# Patient Record
Sex: FEMALE | Race: WHITE | URBAN - METROPOLITAN AREA
[De-identification: names, ages, dates, MRNs, and addresses within clinical notes are randomized per-mention and may not be internally consistent; named-entity substitution may affect disease eponyms.]

---
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
## 2018-05-31 ENCOUNTER — TELEPHONE (OUTPATIENT)
Dept: NEUROLOGY | Facility: CLINIC | Age: 18
End: 2018-05-31

## 2018-05-31 NOTE — TELEPHONE ENCOUNTER
Caller: Mi   Relationship to Patient: self   Call Back Number: [phone]   Reason for Call: Please call the patient in regards to getting back on birth control that she was on previously. Please leave a message if she does not answer    Mi calls reporting she had a copper IUD placed. In addition, she would like to begin taking the birth control Levora to help reduce acne. Will discuss with MD and return call to Mi.     PLAN: Package insert for Levora states that Levora can interact with anticonvulsant medications. Advised against taking this medication.     Mi will call back if she has additional questions about hormonal medications.

## 2018-06-11 ENCOUNTER — TELEPHONE (OUTPATIENT)
Dept: NEUROLOGY | Facility: CLINIC | Age: 18
End: 2018-06-11

## 2018-06-11 NOTE — TELEPHONE ENCOUNTER
Nurse received In-Basket message as follows:  Caller: Mi     Relationship to Patient: Self     Call Back Number: [phone]     Reason for Call: Patient was prescribed a new birth control medication, Norethindrone.  Please call patient to let her know if this is safe to take with her other medications.     Review of available information on interactions between Norethindrone and Patient's AEDs indicate that a Lamotrigine dose may be needed as Norethindrone may decrease Lamotrigine levels slightly.     Nurse returned call to with information, offered to send lab order so she can check after a week of being on new med to see where her level is; Patient declined indicating she has not started the new med yet.

## 2018-08-08 DIAGNOSIS — G40.919 INTRACTABLE EPILEPSY WITHOUT STATUS EPILEPTICUS, UNSPECIFIED EPILEPSY TYPE (H): ICD-10-CM

## 2018-08-09 DIAGNOSIS — G40.919 INTRACTABLE EPILEPSY WITHOUT STATUS EPILEPTICUS, UNSPECIFIED EPILEPSY TYPE (H): ICD-10-CM

## 2018-08-09 RX ORDER — LEVETIRACETAM 250 MG/1
TABLET ORAL
Qty: 60 TABLET | Refills: 0 | OUTPATIENT
Start: 2018-08-09

## 2018-08-09 RX ORDER — LAMOTRIGINE 200 MG/1
TABLET ORAL
Qty: 60 TABLET | Refills: 0 | Status: CANCELLED | OUTPATIENT
Start: 2018-08-09

## 2018-08-09 RX ORDER — LAMOTRIGINE 200 MG/1
TABLET ORAL
Qty: 60 TABLET | Refills: 0 | OUTPATIENT
Start: 2018-08-09

## 2018-08-09 RX ORDER — LAMOTRIGINE 25 MG/1
TABLET ORAL
Qty: 60 TABLET | Refills: 0 | OUTPATIENT
Start: 2018-08-09

## 2018-08-09 RX ORDER — LEVETIRACETAM 250 MG/1
TABLET ORAL
Qty: 60 TABLET | Refills: 0 | Status: CANCELLED | OUTPATIENT
Start: 2018-08-09

## 2018-08-10 DIAGNOSIS — G40.919 INTRACTABLE EPILEPSY WITHOUT STATUS EPILEPTICUS, UNSPECIFIED EPILEPSY TYPE (H): Primary | ICD-10-CM

## 2018-08-10 RX ORDER — LAMOTRIGINE 200 MG/1
TABLET ORAL
Qty: 62 TABLET | Refills: 0 | Status: SHIPPED | OUTPATIENT
Start: 2018-08-10 | End: 2018-09-06

## 2018-08-10 RX ORDER — LEVETIRACETAM 250 MG/1
250 TABLET ORAL 2 TIMES DAILY
Qty: 62 TABLET | Refills: 0 | Status: SHIPPED | OUTPATIENT
Start: 2018-08-10 | End: 2018-09-06

## 2018-08-10 RX ORDER — LAMOTRIGINE 25 MG/1
TABLET ORAL
Qty: 62 TABLET | Refills: 0 | Status: SHIPPED | OUTPATIENT
Start: 2018-08-10 | End: 2018-09-06

## 2018-09-06 ENCOUNTER — OFFICE VISIT (OUTPATIENT)
Dept: NEUROLOGY | Facility: CLINIC | Age: 18
End: 2018-09-06
Payer: COMMERCIAL

## 2018-09-06 ENCOUNTER — ALLIED HEALTH/NURSE VISIT (OUTPATIENT)
Dept: NEUROLOGY | Facility: CLINIC | Age: 18
End: 2018-09-06
Payer: COMMERCIAL

## 2018-09-06 VITALS
DIASTOLIC BLOOD PRESSURE: 63 MMHG | SYSTOLIC BLOOD PRESSURE: 99 MMHG | HEART RATE: 79 BPM | TEMPERATURE: 97.8 F | BODY MASS INDEX: 20.67 KG/M2 | WEIGHT: 113 LBS

## 2018-09-06 DIAGNOSIS — G40.919 INTRACTABLE EPILEPSY WITHOUT STATUS EPILEPTICUS, UNSPECIFIED EPILEPSY TYPE (H): ICD-10-CM

## 2018-09-06 DIAGNOSIS — G40.919 INTRACTABLE EPILEPSY WITHOUT STATUS EPILEPTICUS (H): Primary | ICD-10-CM

## 2018-09-06 RX ORDER — FOLIC ACID 1 MG/1
2 TABLET ORAL DAILY
Qty: 180 TABLET | Refills: 3 | Status: SHIPPED | OUTPATIENT
Start: 2018-09-06 | End: 2019-08-31

## 2018-09-06 RX ORDER — LAMOTRIGINE 25 MG/1
TABLET ORAL
Qty: 180 TABLET | Refills: 3 | Status: SHIPPED | OUTPATIENT
Start: 2018-09-06 | End: 2019-03-06

## 2018-09-06 RX ORDER — LAMOTRIGINE 200 MG/1
TABLET ORAL
Qty: 180 TABLET | Refills: 3 | Status: SHIPPED | OUTPATIENT
Start: 2018-09-06 | End: 2019-03-06

## 2018-09-06 RX ORDER — LEVETIRACETAM 250 MG/1
250 TABLET ORAL 2 TIMES DAILY
Qty: 60 TABLET | Refills: 11 | Status: SHIPPED | OUTPATIENT
Start: 2018-09-06 | End: 2019-03-06

## 2018-09-06 ASSESSMENT — PAIN SCALES - GENERAL: PAINLEVEL: NO PAIN (0)

## 2018-09-06 NOTE — MR AVS SNAPSHOT
After Visit Summary   9/6/2018    Mi Mascorro    MRN: [id number]           Patient Information     Date Of Birth          2000        Visit Information        Provider Department      9/6/2018 2:30 PM Adia Kirk MD [facility]        Today's Diagnoses     Intractable epilepsy without status epilepticus, unspecified epilepsy type (H)          Care Instructions                     Medication Name   Tablet Size         AM  (morning)   PM (Night)   Notes    Week 1   levetiracetam 250 mg   0 tablet    1 tablet      Week 2    levetiracetam 250 mg   0 tablet    0 tablet                  Continue lamotrigine 250 mg am and 200 mg pm     If you have a seizure call [facility].  Increase lamotrigine 250 mg twice a day if possible, if not, then start levetiracetam         CONTINUE TAKING YOUR OTHER MEDICATIONS AS PREVIOUSLY DIRECTED.  IF YOU  HAVE ANY SIDE EFFECTS OR CONCERNS ABOUT YOUR ANTIEPILEPTIC DRUG CALL [facility] OFFICE [phone]. PLEASE FOLLOW MEDICATION CHANGES AS ADVISED.     This titration schedule was revive wed with patient or caregiver. They expressed understanding of these antiepileptic drug changes.     ADIA KIRK MD               Follow-ups after your visit        Follow-up notes from your care team     Return in about 1 year (around 9/6/2019) for Nurse Call in 2 weeks for AED changes, Doctor Visit.      Your next 10 appointments already scheduled     Sep 20, 2018  4:00 PM CDT   Telephone Call with [facility] Nurse 2   [facility])    [address], [address]  [facility] [address] [phone]           Note: this is not an onsite visit; there is no need to come to the facility.            Mar 06, 2019 11:30 AM CST   Return Visit with Adia Kirk MD   [facility] ([facility])    [address], [address]  [facility] [address] [phone]              Future tests that were ordered for you today      Open Future Orders        Priority Expected Expires Ordered    Comprehensive metabolic panel Routine 9/6/2018 11/6/2018 9/6/2018    CBC with platelets differential Routine 9/6/2018 11/6/2018 9/6/2018    Vitamin B12 Routine 9/6/2018 11/6/2018 9/6/2018    TSH with free T4 reflex Routine 9/6/2018 11/6/2018 9/6/2018    Methylmalonic acid Routine 9/6/2018 11/6/2018 9/6/2018    Vitamin D Deficiency (Calcifediol) Routine 9/6/2018 11/6/2018 9/6/2018    Keppra (Levetiracetam) Level Routine 9/6/2018 11/6/2018 9/6/2018    Lamotrigine Level Routine 9/6/2018 11/6/2018 9/6/2018            Who to contact     Please call your clinic at [phone] to:    Ask questions about your health    Make or cancel appointments    Discuss your medicines    Learn about your test results    Speak to your doctor            Additional Information About Your Visit        Care EveryWhere ID     This is your Care EveryWhere ID. This could be used by other organizations to access your [place] medical records  [id number]        Your Vitals Were     Pulse Temperature BMI (Body Mass Index)             79 97.8  F (36.6  C) 20.67 kg/m2          Blood Pressure from Last 3 Encounters:   09/06/18 99/63   08/08/17 103/64   06/07/17 104/71    Weight from Last 3 Encounters:   09/06/18 113 lb (51.3 kg)   08/08/17 115 lb 6.4 oz (52.3 kg) (27 %)*   06/07/17 108 lb (49 kg) (14 %)*     * Growth percentiles are based on [facility] 2-20 Years data.                 Today's Medication Changes          These changes are accurate as of 9/6/18  3:31 PM.  If you have any questions, ask your nurse or doctor.               Start taking these medicines.        Dose/Directions    folic acid 1 MG tablet   Commonly known as:  FOLVITE   Used for:  Intractable epilepsy without status epilepticus, unspecified epilepsy type (H)   Started by:  Adia Kirk MD        Dose:  2 mg   Take 2 tablets (2 mg) by mouth daily   Quantity:  180 tablet   Refills:  3         These medicines have  changed or have updated prescriptions.        Dose/Directions    levETIRAcetam 250 MG tablet   Commonly known as:  KEPPRA   This may have changed:  how much to take   Used for:  Intractable epilepsy without status epilepticus, unspecified epilepsy type (H)        Dose:  250 mg   Take 1 tablet (250 mg) by mouth 2 times daily   Quantity:  60 tablet   Refills:  11            Where to get your medicines      These [facility] [address] [place] [address] AT [facility] [address]  [address] [facility] [address]    Hours:  24-hours Phone:  [phone]     folic acid 1 MG tablet    lamoTRIgine 200 MG tablet    lamoTRIgine 25 MG tablet    levETIRAcetam 250 MG tablet                Primary Care Provider Office Phone # Fax #    DEMARIO Mario, SEAN [phone]       [facility] [address] [facility] [address]        Equal Access to Services     ALISTAIR HATCH AH: Hadii shadi coon hadasho Soomaali, waaxda luqadaha, qaybta kaalmada adeegyada, shivam conti . So [facility] [phone].    ATENCIÓN: Si lewis fabian, tiene a nolasco disposición servicios gratuitos de asistencia lingüística. Chanceame al [phone].    We comply with applicable federal civil rights laws and [place] laws. We do not discriminate on the basis of race, color, national origin, age, disability, sex, sexual orientation, or gender identity.            Thank you!     Thank you for choosing [facility]  for your care. Our goal is always to provide you with excellent care. Hearing back from our patients is one way we can continue to improve our services. Please take a few minutes to complete the written survey that you may receive in the mail after your visit with us. Thank you!             Your Updated Medication List - Protect others around you: Learn how to safely use, store and throw away your medicines at www.disposemymeds.org.          This list is accurate  as of 9/6/18  3:31 PM.  Always use your most recent med list.                   Brand Name Dispense Instructions for use Diagnosis    cholecalciferol 5000 units Caps capsule    vitamin D3     Take 5,000 Units by mouth daily    Intractable epilepsy without status epilepticus, unspecified epilepsy type (H)       diazepam 5 MG/ML (HIGH CONC) solution    DIAZEPAM INTENSOL    60 mL    Give 2.5 mg (0.5 ml) for generalized tonic-clonic convulsion greater than 3 minutes. May repeat another 2.5 mg (0.5ml) after 10 minutes if she has another seizure.    Intractable epilepsy without status epilepticus, unspecified epilepsy type (H)       folic acid 1 MG tablet    FOLVITE    180 tablet    Take 2 tablets (2 mg) by mouth daily    Intractable epilepsy without status epilepticus, unspecified epilepsy type (H)       * lamoTRIgine 200 MG tablet    LaMICtal    180 tablet    Take 200 mg AM and PM    Intractable epilepsy without status epilepticus, unspecified epilepsy type (H)       * lamoTRIgine 25 MG tablet    LaMICtal    180 tablet    50 mg am (along with 200 mg)    Intractable epilepsy without status epilepticus, unspecified epilepsy type (H)       levETIRAcetam 250 MG tablet    KEPPRA    60 tablet    Take 1 tablet (250 mg) by mouth 2 times daily    Intractable epilepsy without status epilepticus, unspecified epilepsy type (H)       norethindrone-ethinyl estradiol 1.5-30 MG-MCG per tablet    MICROGESTIN 1.5/30     Take 1 tablet by mouth daily    Intractable epilepsy without status epilepticus, unspecified epilepsy type (H)       sertraline 50 MG tablet    ZOLOFT     Take 50 mg by mouth daily    Intractable epilepsy without status epilepticus, unspecified epilepsy type (H)       * Notice:  This list has 2 medication(s) that are the same as other medications prescribed for you. Read the directions carefully, and ask your doctor or other care provider to review them with you.

## 2018-09-06 NOTE — PATIENT INSTRUCTIONS
Medication Name   Tablet Size         AM  (morning)   PM (Night)   Notes    Week 1   levetiracetam 250 mg   0 tablet    1 tablet      Week 2    levetiracetam 250 mg   0 tablet    0 tablet                  Continue lamotrigine 250 mg am and 200 mg pm     [facility] a [facility]  Increase lamotrigine 250 mg twice a day if possible, if not, then start levetiracetam         CONTINUE TAKING YOUR OTHER MEDICATIONS AS PREVIOUSLY DIRECTED.  IF YOU  HAVE ANY SIDE EFFECTS OR CONCERNS ABOUT YOUR ANTIEPILEPTIC DRUG [facility] -[phone]. PLEASE FOLLOW MEDICATION CHANGES AS ADVISED.     This titration schedule was revive wed with patient or caregiver. They expressed understanding of these antiepileptic drug changes.     AMALIA ROSENBERG MD

## 2018-09-06 NOTE — MR AVS SNAPSHOT
After Visit Summary   9/6/2018    Mi Mascorro    MRN: [id number]           Patient Information     Date Of Birth          2000        Visit Information        Provider Department      9/6/2018 11:30 AM [facility]        Today's Diagnoses     Intractable epilepsy without status epilepticus (H)    -  1       Follow-ups after your visit        Your next 10 appointments already scheduled     Sep 20, 2018  4:00 PM CDT   Telephone Call with [facility] Nurse 2   [facility] ([facility])    [address], [address]  [facility] [id number] [phone]           Note: this is not an onsite visit; there is no need to come to the facility.            Mar 06, 2019 11:30 AM CST   Return Visit with Adia Kirk MD   [facility] ([facility])    [address], [address]  [facility] [id number] [phone]              Who to contact     Please call your clinic at [phone] to:    Ask questions about your health    Make or cancel appointments    Discuss your medicines    Learn about your test results    Speak to your doctor            Additional Information About Your Visit        Care EveryWhere ID     This is your Care EveryWhere ID. This could be used by other organizations to access your [place] medical records  [id number]         Blood Pressure from Last 3 Encounters:   No data found for BP    Weight from Last 3 Encounters:   No data found for Wt              Today, you had the following     No orders found for display         Today's Medication Changes          These changes are accurate as of 9/6/18 11:59 PM.  If you have any questions, ask your nurse or doctor.               Start taking these medicines.        Dose/Directions    folic acid 1 MG tablet   Commonly known as:  FOLVITE   Used for:  Intractable epilepsy without status epilepticus, unspecified epilepsy type (H)   Started by:  Adia Kirk MD         Dose:  2 mg   Take 2 tablets (2 mg) by mouth daily   Quantity:  180 tablet   Refills:  3         These medicines have changed or have updated prescriptions.        Dose/Directions    levETIRAcetam 250 MG tablet   Commonly known as:  KEPPRA   This may have changed:  how much to take   Used for:  Intractable epilepsy without status epilepticus, unspecified epilepsy type (H)        Dose:  250 mg   Take 1 tablet (250 mg) by mouth 2 times daily   Quantity:  60 tablet   Refills:  11            Where to get your medicines      [facility] [address] [place] [address] AVE AT [facility] [address]  [address] [facility] [address]    Hours:  24-hours Phone:  [phone]     folic acid 1 MG tablet    lamoTRIgine 200 MG tablet    lamoTRIgine 25 MG tablet    levETIRAcetam 250 MG tablet                Primary Care Provider Office Phone # Fax #    DEMARIO Mario, SEAN [phone]       [facility] [address] [place] [address]        Equal Access to Services     [facility] AH: Hadii shadi coon hadasho Soomaali, waaxda luqadaha, qaybta kaalmada adeegyadaniel, shivam conti . So [facility] [phone].    ATENCIÓN: Si habla español, tiene a nolasco disposición servicios gratuitos de asistencia lingüística. [facility] [phone].    We comply with applicable federal civil rights laws and [place] laws. We do not discriminate on the basis of race, color, national origin, age, disability, sex, sexual orientation, or gender identity.            Thank you!     Thank you for choosing [facility]  for your care. Our goal is always to provide you with excellent care. Hearing back from our patients is one way we can continue to improve our services. Please take a few minutes to complete the written survey that you may receive in the mail after your visit with us. Thank you!             Your Updated Medication List - Protect others around you:  Learn how to safely use, store and throw away your medicines at www.disposemymeds.org.          This list is accurate as of 9/6/18 11:59 PM.  Always use your most recent med list.                   Brand Name Dispense Instructions for use Diagnosis    cholecalciferol 5000 units Caps capsule    vitamin D3     Take 5,000 Units by mouth daily    Intractable epilepsy without status epilepticus, unspecified epilepsy type (H)       diazepam 5 MG/ML (HIGH CONC) solution    DIAZEPAM INTENSOL    60 mL    Give 2.5 mg (0.5 ml) for generalized tonic-clonic convulsion greater than 3 minutes. May repeat another 2.5 mg (0.5ml) after 10 minutes if she has another seizure.    Intractable epilepsy without status epilepticus, unspecified epilepsy type (H)       folic acid 1 MG tablet    FOLVITE    180 tablet    Take 2 tablets (2 mg) by mouth daily    Intractable epilepsy without status epilepticus, unspecified epilepsy type (H)       * lamoTRIgine 200 MG tablet    LaMICtal    180 tablet    Take 200 mg AM and PM    Intractable epilepsy without status epilepticus, unspecified epilepsy type (H)       * lamoTRIgine 25 MG tablet    LaMICtal    180 tablet    50 mg am (along with 200 mg)    Intractable epilepsy without status epilepticus, unspecified epilepsy type (H)       levETIRAcetam 250 MG tablet    KEPPRA    60 tablet    Take 1 tablet (250 mg) by mouth 2 times daily    Intractable epilepsy without status epilepticus, unspecified epilepsy type (H)       norethindrone-ethinyl estradiol 1.5-30 MG-MCG per tablet    MICROGESTIN 1.5/30     Take 1 tablet by mouth daily    Intractable epilepsy without status epilepticus, unspecified epilepsy type (H)       sertraline 50 MG tablet    ZOLOFT     Take 50 mg by mouth daily    Intractable epilepsy without status epilepticus, unspecified epilepsy type (H)       * Notice:  This list has 2 medication(s) that are the same as other medications prescribed for you. Read the directions carefully, and ask  your doctor or other care provider to review them with you.

## 2018-09-06 NOTE — PROGRESS NOTES
[facility]    Patient:  Mi Mascorro  :  2000   Age:  18 year old   Today's Office Visit:  2018    Epilepsy Data:  Patient History  Primary Epileptologist/Provider: Adia Kirk M.D.  Age of Onset: 14   Tests/Surgery History  Last MRI: 3/27/2015 (normal )  Seizure Record  Current Visit Date: 18  Previous Visit Date: 17  Months since last visit: 12.94  Seizure Type 1: Complex partial seizures unspecified  Description of Sz Type 1: has no warning signs.  Her mom has witnessed the seizure and states her head twitches back and forth and then she goes straight into whole body convulsion, where she stiffens and then jerks.  She makes gurgling noises.  Her face turns blue.  She has had tongue biting and the left side of her mouth.  # of Type 1 Seizure since last visit: 0  Freq. Type 1 / Month: 0  Seizure Type 2: Partial seizures with secondary generalization  -  with complex partial seizures evolving to generalized seizures  # of Type 2 Seizure since last visit: 0  Freq. Type 2 / Month: 0         SEIZURE HISTORY:  Miss Mascorro's seizures, started on 2015, at the age of 14.  She had a generalized tonic-clonic convulsion. Second seizure was 2015 and after 02/10/2017, 2017 and 2017.  From May 2017-2017, she averaged 2 generalized tonic-clonic convulsion per month.  She was initially started on levetiracetam and then lamotrigine.  She was not able to tolerate levetiracetam 1000 mg as it caused excessive fatigue.  Prior to 2015, the patient has not had any seizures, no staring spells, no myoclonic seizures, no nocturnal spells in which she has convulsions.  She was started on Wellbutrin in 2014. MRI of the brain on 2015 was normal.  MRI of the brain on 2015 was normal.  [facility] at [facility] on [facility], read by Dr. Bueno, was normal.     Interval History:   She came alone. Last seizure was 2017 (unwitnessed, she came to  and called 911, she had cuts on her hand, bite her tongue, she had nausea). Since her last visit we increased lamotrigine and stopped Wellbutrin she is seizure free. She is sleeping 10 hours per day and naps 1-2 per day. Currently, on antiepileptic drug there is no double vision, no mood changes, no nausea, no vomiting, no abdominal pain, no rashes. No recent ER visits and no hospitalizations since last visit.    Prior to Admission medications    Medication Sig Start Date End Date Taking? Authorizing Provider   cholecalciferol (VITAMIN D3) 5000 UNITS CAPS capsule Take 5,000 Units by mouth daily   Yes Reported, Patient   lamoTRIgine (LAMICTAL) 100 MG tablet Take 150 mg by mouth 2 times daily 5/10/17  Yes Reported, Patient   norethindrone-ethinyl estradiol (MICROGESTIN 1.5/30) 1.5-30 MG-MCG per tablet Take 1 tablet by mouth daily   Yes Reported, Patient   sertraline (ZOLOFT) 50 MG tablet Take 50 mg by mouth daily   Yes Reported, Patient   levETIRAcetam (KEPPRA) 250 MG tablet Take 250 mg by mouth 2 times daily   Yes Reported, Patient   lamoTRIgine (LAMICTAL) 25 MG tablet Titrate as recommended to 250 mg am and 200 mg pm 6/7/17  Yes Adia Kirk MD   diazepam (DIAZEPAM INTENSOL) 5 MG/ML (HIGH CONC) solution Give 2.5 mg (0.5 ml) for generalized tonic-clonic convulsion greater than 3 minutes. May repeat another 2.5 mg (0.5ml) after 10 minutes if she has another seizure. 6/7/17  Yes Adia Kirk MD            CURRENT MEDICATIONS:   1.  Levetiracetam 250 mg twice a day.   2.  Lamotrigine 250 mg am and 200 mg pm    3.  Copper IUD     PAST ANTIEPILEPTIC MEDICATIONS:  The patient's only tried Keppra and lamotrigine.  No other seizure medications have been used.  Levetiracetam 250 mg twice a day causes sedation.      SOCIAL HISTORY:  The patient has completed high school.  She graduated with 4.0 GPA, she enjoys learning about animals.  She is working at [employer]. She has several bearded dragons (lizards) and likes to raise  them and then sell them.  She will work as a  at her mom [employer] and then would like to go to [place]. She describes early childhood as normal.  Mom  when she was 3.  She does have a history where she was bullied when in 2008 in 8th grade and that was very stressful for her.  She does not drink alcohol.  She does not smoke.  She rarely has caffeine.  No recreational drug use. She is not sexually active.  She would like to move to [place] in the future.       REVIEW OF SYSTEMS:  Notable for headaches, improved depression, improvement in sleep.       WOMEN'S HISTORY:  The patient started menstruating at the age of 10 and has menses monthly.  She is off  oral contraceptive pill.  She is not sexually active. She does not want to get pregnant in the near future.      EXAMINATION BP 99/63 (BP Location: Left arm, Patient Position: Chair, Cuff Size: Adult Regular)  Pulse 79  Temp 97.8  F (36.6  C)  Wt 113 lb (51.3 kg)  BMI 20.67 kg/m2  Alert, orientated, speech is fluent, pupils are equal, round, face symmetric, no pronator drip, equal  strength, reflexes are symmetric, normal to light touch with no sensory deficits noted, finger to nose normal, no focal deficits noted.Gait is stable. Able to tandem gait.          ASSESSMENT:  Ms Mascorro is an 17-year-old right-handed female presents with history of generalized tonic-clonic convulsion. These have stopped once we optimized lamotrigine. She is sedated on levetiracetam and we will wean off levetiracetam. She has been seizure free over one year. EEG has been normal, today, she had one questionable sharp wave in the left temporal region. Her EEG have not shown clear epileptiform discharges or seizures. Its not clear if she has a primary generalized epilepsy or if she has a focal epilepsy that secondarily generalizes.  MRI of the brain and EEG have been reported normal in the past. Her last seizure was 5/2017.      PLAN:   1. Wean off levetiracetam               Medication Name   Tablet Size         AM  (morning)   PM (Night)   Notes    Week 1   levetiracetam 250 mg   0 tablet    1 tablet      Week 2    levetiracetam 250 mg   0 tablet    0 tablet                  2. Continue lamotrigine 250 mg am and 200 mg pm     3. If she have a seizure Increase lamotrigine 250 mg twice a day if possible, if not then start levetiracetam     4. Signed up for [facility] genotyping study 2017          I spent 30 minutes with the patient. During this time counseling and coordination of care exceeded 50% of the face to face visit time. I addressed all questions the patient raised in regards to their medical care.        AMALIA ROSENBERG MD

## 2018-09-06 NOTE — PROGRESS NOTES
.  [facility] [address]  OP/3hr Video EEG  [facility] [place]  Dr. Reagan isaacs

## 2018-09-06 NOTE — LETTER
2018     RE: Mi Mascorro  : 2000   MRN: [id number]      Dear Colleague,    Thank you for referring your patient, Mi Mascorro, to the [facility] EPILEPSY CARE at [facility]. Please see a copy of my visit note below.    [facility]    Patient:  Mi Mascorro  :  2000   Age:  18 year old   Today's Office Visit:  2018    Epilepsy Data:  Patient History  Primary Epileptologist/Provider: Adia Kirk M.D.  Age of Onset: 14   Tests/Surgery History  Last MRI: 3/27/2015 (normal )  Seizure Record  Current Visit Date: 18  Previous Visit Date: 17  Months since last visit:   Seizure Type 1: Complex partial seizures unspecified  Description of Sz Type 1: has no warning signs.  Her mom has witnessed the seizure and states her head twitches back and forth and then she goes straight into whole body convulsion, where she stiffens and then jerks.  She makes gurgling noises.  Her face turns blue.  She has had tongue biting and the left side of her mouth.  # of Type 1 Seizure since last visit: 0  Freq. Type 1 / Month: 0  Seizure Type 2: Partial seizures with secondary generalization  -  with complex partial seizures evolving to generalized seizures  # of Type 2 Seizure since last visit: 0  Freq. Type 2 / Month: 0         SEIZURE HISTORY:  Miss Mascorro's seizures, started on 2015, at the age of 14.  She had a generalized tonic-clonic convulsion. Second seizure was 2015 and after 02/10/2017, 2017 and 2017.  From May 2017-2017, she averaged 2 generalized tonic-clonic convulsion per month.  She was initially started on levetiracetam and then lamotrigine.  She was not able to tolerate levetiracetam 1000 mg as it caused excessive fatigue.  Prior to 2015, the patient has not had any seizures, no staring spells, no myoclonic seizures, no nocturnal spells in which she has convulsions.  She was started on  Wellbutrin in 12/2014. MRI of the brain on 04/02/2015 was normal.  MRI of the brain on 03/27/2015 was normal.  EEG at [facility] on 04/14/2015, read by Dr. Bueno, was normal.     Interval History:   She came alone. Last seizure was 5/6/2017 (unwitnessed, she came to and called 911, she had cuts on her hand, bite her tongue, she had nausea). Since her last visit we increased lamotrigine and stopped Wellbutrin she is seizure free. She is sleeping 10 hours per day and naps 1-2 per day. Currently, on antiepileptic drug there is no double vision, no mood changes, no nausea, no vomiting, no abdominal pain, no rashes. No recent ER visits and no hospitalizations since last visit.    Prior to Admission medications    Medication Sig Start Date End Date Taking? Authorizing Provider   cholecalciferol (VITAMIN D3) 5000 UNITS CAPS capsule Take 5,000 Units by mouth daily   Yes Reported, Patient   lamoTRIgine (LAMICTAL) 100 MG tablet Take 150 mg by mouth 2 times daily 5/10/17  Yes Reported, Patient   norethindrone-ethinyl estradiol (MICROGESTIN 1.5/30) 1.5-30 MG-MCG per tablet Take 1 tablet by mouth daily   Yes Reported, Patient   sertraline (ZOLOFT) 50 MG tablet Take 50 mg by mouth daily   Yes Reported, Patient   levETIRAcetam (KEPPRA) 250 MG tablet Take 250 mg by mouth 2 times daily   Yes Reported, Patient   lamoTRIgine (LAMICTAL) 25 MG tablet Titrate as recommended to 250 mg am and 200 mg pm 6/7/17  Yes Adia Kirk MD   diazepam (DIAZEPAM INTENSOL) 5 MG/ML (HIGH CONC) solution Give 2.5 mg (0.5 ml) for generalized tonic-clonic convulsion greater than 3 minutes. May repeat another 2.5 mg (0.5ml) after 10 minutes if she has another seizure. 6/7/17  Yes Adia Kirk MD            CURRENT MEDICATIONS:   1.  Levetiracetam 250 mg twice a day.   2.  Lamotrigine 250 mg am and 200 mg pm    3.  Copper IUD     PAST ANTIEPILEPTIC MEDICATIONS:  The patient's only tried Keppra and lamotrigine.  No other seizure medications have been  used.  Levetiracetam 250 mg twice a day causes sedation.      SOCIAL HISTORY:  The patient has completed high school.  She graduated with 4.0 GPA, she enjoys learning about animals.  She is working at [employer]. She has several bearded dragons (lizards) and likes to raise them and then sell them.  She will work as a  at her mom [employer] and then would like to go to [place]. She describes early childhood as normal.  Mom  when she was 3.  She does have a history where she was bullied when in 2008 in 8th grade and that was very stressful for her.  She does not drink alcohol.  She does not smoke.  She rarely has caffeine.  No recreational drug use. She is not sexually active.  She would like to move to [place] in the future.       REVIEW OF SYSTEMS:  Notable for headaches, improved depression, improvement in sleep.       WOMEN'S HISTORY:  The patient started menstruating at the age of 10 and has menses monthly.  She is off  oral contraceptive pill.  She is not sexually active. She does not want to get pregnant in the near future.      EXAMINATION BP 99/63 (BP Location: Left arm, Patient Position: Chair, Cuff Size: Adult Regular)  Pulse 79  Temp 97.8  F (36.6  C)  Wt 113 lb (51.3 kg)  BMI 20.67 kg/m2  Alert, orientated, speech is fluent, pupils are equal, round, face symmetric, no pronator drip, equal  strength, reflexes are symmetric, normal to light touch with no sensory deficits noted, finger to nose normal, no focal deficits noted.Gait is stable. Able to tandem gait.          ASSESSMENT:  Ms Mascorro is an 17-year-old right-handed female presents with history of generalized tonic-clonic convulsion. These have stopped once we optimized lamotrigine. She is sedated on levetiracetam and we will wean off levetiracetam. She has been seizure free over one year. EEG has been normal, today, she had one questionable sharp wave in the left temporal region. Her EEG have not shown clear epileptiform  discharges or seizures. Its not clear if she has a primary generalized epilepsy or if she has a focal epilepsy that secondarily generalizes.  MRI of the brain and EEG have been reported normal in the past. Her last seizure was 5/2017.      PLAN:   1. Wean off levetiracetam              Medication Name   Tablet Size         AM  (morning)   PM (Night)   Notes    Week 1   levetiracetam 250 mg   0 tablet    1 tablet      Week 2    levetiracetam 250 mg   0 tablet    0 tablet                  2. Continue lamotrigine 250 mg am and 200 mg pm     3. If she have a seizure Increase lamotrigine 250 mg twice a day if possible, if not then start levetiracetam     4. Signed up for [facility] genotyping study 2017     I spent 30 minutes with the patient. During this time counseling and coordination of care exceeded 50% of the face to face visit time. I addressed all questions the patient raised in regards to their medical care.        AMALIA ROSENBERG MD

## 2018-09-07 NOTE — PROCEDURES
Procedure Date: 09/06/2018      EEG #:  [id number]      Video EEG on 09/06/2018.      SOURCE FILE DURATION:  2 hours and 45 minutes.      PATIENT INFORMATION:  This is a 18-year-old female who has a history of epilepsy.  The patient is on levetiracetam and lamotrigine.  EEG is being done to evaluate for seizures.     TECHNICAL SUMMARY: This video EEG monitoring procedure was performed with 23 scalp electrodes in 10-20 system placements, and additional scalp, precordial and other surface electrodes used for electrical referencing and artifact detection. Video was reviewed intermittently by EEG technologist and physician for electroclinical seizures.      BACKGROUND:  In the fully awake state, the patient has a 9-10 Hz parieto-occipital rhythm that is symmetric and reactive, well modulated.  Frontal derivations of symmetric beta activity.  There is no focal slowing identified in the fully awake state.  Please note in the early half of the record, electrodes T6, P4 were switched and they were fixed after hyperventilation.  The patient does fall asleep and has well-formed sleep architecture consisting of symmetric sleep spindles, vertex waves, POSTs and a mixture of delta-theta slowing.      ACTIVATION PROCEDURES:  Photic stimulation and hyperventilation were completed with no significant abnormality.      EPILEPTIFORM DISCHARGES:  The patient had 1 sharp wave in the left temporal region, maximum negativity was seen at T3 on a bipolar montage.  On a referential montage, we see that there is maximum negativity at T3 with a subtle field into F7 and a positive polarity on T5.  There is also max field into C3 and P3 raising the concern that this may possibly be a focal epileptiform discharge.  There is no after-going slow wave. However, patient was laying on her left side and this may produce electrode artifact.      ICTAL:  No electrographic seizures.      IMPRESSION:  The majority of the 3-hour video EEG is within normal  limits in the awake and sleep state.  There was one sharp wave in the left temporal region with no after-going slow.  It is difficult to define if this is clearly an epileptiform discharge as the patient is lying on the left side of her head.  It would be helpful to have further EEG data if clinically indicated.  No further abnormalities were seen outside of this one finding.  Additionally, no electrographic seizures were seen.  Clinical correlation is advised.         AMALIA ROSENBERG MD             D: 2018   T: 2018   MT: nemesio      Name:     BHANU POSADA   MRN:      2013        Account:        [id number]   :      2000           Procedure Date: 2018      Document: [id number]

## 2018-09-12 ENCOUNTER — TRANSFERRED RECORDS (OUTPATIENT)
Dept: HEALTH INFORMATION MANAGEMENT | Facility: CLINIC | Age: 18
End: 2018-09-12

## 2018-09-14 LAB
25 OH VIT D TOTAL: 33.2 NG/ML (ref 30–80)
ALBUMIN SERPL-MCNC: 3.9 G/DL (ref 3.5–5)
ALP SERPL-CCNC: 61 U/L (ref 32–104)
ALT SERPL-CCNC: 14 U/L (ref 18–65)
ANION GAP SERPL CALCULATED.3IONS-SCNC: 7 MMOL/L (ref 5–15)
AST SERPL-CCNC: 12 U/L (ref 10–30)
BASOPHILS NFR BLD AUTO: 0.13 % (ref 0–0.2)
BILIRUB SERPL-MCNC: 0.2 MG/DL (ref 0.2–1)
BUN SERPL-MCNC: 11 MG/DL (ref 8–23)
CALCIUM SERPL-MCNC: 8.4 MG/DL (ref 9.2–11)
CHLORIDE SERPLBLD-SCNC: 108 MMOL/L (ref 98–107)
CO2 SERPL-SCNC: 25 MMOL/L (ref 23–32)
CREAT SERPL-MCNC: 0.87 MG/DL (ref 0.7–1.2)
EOSINOPHIL NFR BLD AUTO: 0.2 % (ref 0.04–0.54)
ERYTHROCYTE [DISTWIDTH] IN BLOOD BY AUTOMATED COUNT: 12.7 % (ref 11.6–14.4)
GLUCOSE SERPL-MCNC: 71 MG/DL (ref 60–99)
HCT VFR BLD AUTO: 35.8 % (ref 34.1–44.9)
HEMOGLOBIN: 12.2 G/DL (ref 11.2–15.7)
KEPPRA (LEVETIRACETAM) LEVEL: 9 MCG/ML (ref 12–46)
LAMOTRIGINE SERPL-MCNC: 12.5 MCG/ML (ref 2.5–15)
LYMPHOCYTES NFR BLD AUTO: 2.03 % (ref 1.18–3.74)
MCH RBC QN AUTO: 31.5 PG (ref 25.6–32.2)
MCHC RBC AUTO-ENTMCNC: 34.1 G/DL (ref 32.2–36.5)
MCV RBC AUTO: 92.5 FL (ref 80–98)
METHYLMALONIC ACID QN S: 0.33 NMOL/ML
MONOCYTES NFR BLD AUTO: 0.47 % (ref 0.24–0.82)
NEUTROPHILS NFR BLD AUTO: 3.1 % (ref 1.56–6.13)
PLATELET COUNT - QUEST: 286 10^9/L (ref 150–450)
POTASSIUM SERPL-SCNC: 4 MMOL/L (ref 3.5–5.1)
PROT SERPL-MCNC: 7 G/DL (ref 6–8)
RBC # BLD AUTO: 3.87 10^12/L (ref 3.93–5.55)
SODIUM SERPL-SCNC: 140 MMOL/L (ref 136–145)
TSH SERPL-ACNC: 2.54 UIU/ML (ref 0.35–4.8)
VIT B12 SERPL-MCNC: 568 PG/ML (ref 211–911)
WBC # BLD AUTO: 6 10^9/L (ref 4–10)

## 2018-09-17 DIAGNOSIS — G40.919 INTRACTABLE EPILEPSY WITHOUT STATUS EPILEPTICUS, UNSPECIFIED EPILEPSY TYPE (H): ICD-10-CM

## 2018-09-20 ENCOUNTER — VIRTUAL VISIT (OUTPATIENT)
Dept: NEUROLOGY | Facility: CLINIC | Age: 18
End: 2018-09-20
Payer: COMMERCIAL

## 2018-09-20 DIAGNOSIS — G40.919 INTRACTABLE EPILEPSY WITHOUT STATUS EPILEPTICUS, UNSPECIFIED EPILEPSY TYPE (H): Primary | ICD-10-CM

## 2018-09-20 NOTE — MR AVS SNAPSHOT
After Visit Summary   9/20/2018    Mi Mascorro    MRN: [id number]           Patient Information     Date Of Birth          2000        Visit Information        Provider Department      9/20/2018 4:00 PM 2, Me [facility] Nurse [facility]        Today's Diagnoses     Intractable epilepsy without status epilepticus, unspecified epilepsy type (H)    -  1       Follow-ups after your visit        Your next 10 appointments already scheduled     Mar 06, 2019 11:30 AM CST   Return Visit with MD [facility] ([facility])    [address], [address]  [facility] [address] [phone]              Who to contact     Please call your clinic at [phone] to:    Ask questions about your health    Make or cancel appointments    Discuss your medicines    Learn about your test results    Speak to your doctor            Additional Information About Your Visit        Care EveryWhere ID     This is your Care EveryWhere ID. This could be used by other organizations to access your [facility] medical records  [id number]         Blood Pressure from Last 3 Encounters:   09/06/18 99/63   08/08/17 103/64   06/07/17 104/71    Weight from Last 3 Encounters:   09/06/18 113 lb (51.3 kg)   08/08/17 115 lb 6.4 oz (52.3 kg) (27 %)*   06/07/17 108 lb (49 kg) (14 %)*     * Growth percentiles are based on CDC 2-20 Years data.              Today, you had the following     No orders found for display       Primary Care Provider Office Phone # Fax #    DEMARIO Mario, PA-C [phone]       [facility] [address] [address]        Equal Access to Services     [facility] : Hadii shadi Hernadez, waaxda luqadaha, qaybta kaalmada dave, shivam loera. So [facility] [phone].    ATENCIÓN: Si habla español, tiene a nolasco disposición servicios gratuitos de asistencia lingüística. Llame al  [phone].    We comply with applicable federal civil rights laws and [place] laws. We do not discriminate on the basis of race, color, national origin, age, disability, sex, sexual orientation, or gender identity.            Thank you!     Thank you for choosing [facility]PS[facility]  for your care. Our goal is always to provide you with excellent care. Hearing back from our patients is one way we can continue to improve our services. Please take a few minutes to complete the written survey that you may receive in the mail after your visit with us. Thank you!             Your Updated Medication List - Protect others around you: Learn how to safely use, store and throw away your medicines at www.disposemymeds.org.          This list is accurate as of 9/20/18  4:34 PM.  Always use your most recent med list.                   Brand Name Dispense Instructions for use Diagnosis    cholecalciferol 5000 units Caps capsule    vitamin D3     Take 5,000 Units by mouth daily    Intractable epilepsy without status epilepticus, unspecified epilepsy type (H)       diazepam 5 MG/ML (HIGH CONC) solution    DIAZEPAM INTENSOL    60 mL    Give 2.5 mg (0.5 ml) for generalized tonic-clonic convulsion greater than 3 minutes. May repeat another 2.5 mg (0.5ml) after 10 minutes if she has another seizure.    Intractable epilepsy without status epilepticus, unspecified epilepsy type (H)       folic acid 1 MG tablet    FOLVITE    180 tablet    Take 2 tablets (2 mg) by mouth daily    Intractable epilepsy without status epilepticus, unspecified epilepsy type (H)       * lamoTRIgine 200 MG tablet    LaMICtal    180 tablet    Take 200 mg AM and PM    Intractable epilepsy without status epilepticus, unspecified epilepsy type (H)       * lamoTRIgine 25 MG tablet    LaMICtal    180 tablet    50 mg am (along with 200 mg)    Intractable epilepsy without status epilepticus, unspecified epilepsy type (H)       levETIRAcetam 250 MG tablet    KEPPRA     60 tablet    Take 1 tablet (250 mg) by mouth 2 times daily    Intractable epilepsy without status epilepticus, unspecified epilepsy type (H)       norethindrone-ethinyl estradiol 1.5-30 MG-MCG per tablet    MICROGESTIN 1.5/30     Take 1 tablet by mouth daily    Intractable epilepsy without status epilepticus, unspecified epilepsy type (H)       sertraline 50 MG tablet    ZOLOFT     Take 50 mg by mouth daily    Intractable epilepsy without status epilepticus, unspecified epilepsy type (H)       * Notice:  This list has 2 medication(s) that are the same as other medications prescribed for you. Read the directions carefully, and ask your doctor or other care provider to review them with you.

## 2018-09-20 NOTE — PROGRESS NOTES
Call to patient to follow up on medication changes made at the clinic visit with  about two weeks ago.  Patient is to taper off LEV and continue on HANSEN    Patient did not make week one changes until last Sunday, she is still taking  mg daily.  Last dose of LEV will be on Saturday.9/22/2018    Also continuing HANSEN (200/25) 250 mg AM, 200 mg HS    No seizures.  I reassured Mi that  has a plan in place to increase the HANSEN if seizure activity occurs.    Patient had questions about labs that were drawn 9/12/18 (what were the levels and are they okay)  Discussed HANSEN and LEV levels with the patient.    Patient wondering if another call can be placed in about a week in follow up after stopping LEV.  Will ask scheduling to place on Nursing schedule.  Follow up with  reviewed.    No other questions at this time, patient instructed to call if she has questions or concerns.

## 2018-09-27 ENCOUNTER — TELEPHONE (OUTPATIENT)
Dept: NEUROLOGY | Facility: CLINIC | Age: 18
End: 2018-09-27

## 2018-11-07 ENCOUNTER — TELEPHONE (OUTPATIENT)
Dept: NEUROLOGY | Facility: CLINIC | Age: 18
End: 2018-11-07

## 2018-11-07 NOTE — TELEPHONE ENCOUNTER
Call placed to patient.  We discussed that she follow her local providers recommendation for IUD placement.   If she notices any problems with change in seizure frequency or side effects, she should contact us for further advice regarding the seizure medications.    No other questions  Instructed to call if problems or concerns related to seizures/epilepsy

## 2018-11-07 NOTE — TELEPHONE ENCOUNTER
----- Message from Rula Apodaca sent at 11/7/2018  9:56 AM CST -----  Regarding: ethan  Caller: Mi     Relationship to Patient: pt     Call Back Number: [phone]    Reason for Call: Pt is wondering if there was a certain IUD that she could get that wouldn't interfere with her sz meds or give her negative side effects. She doesn't want a copper IUD.

## 2019-02-13 ENCOUNTER — TELEPHONE (OUTPATIENT)
Dept: NEUROLOGY | Facility: CLINIC | Age: 19
End: 2019-02-13

## 2019-02-13 NOTE — TELEPHONE ENCOUNTER
----- Message from Trudy Enriquez MA sent at 2/13/2019 10:04 AM CST -----  Regarding: ethan  Caller: Mi    Relationship to Patient: pt    Call Back Number: [phone]    Reason for Call: Wants to know if she can make her appt in March a phone call appt instead of a in office visit with Dr Kirk.

## 2019-02-13 NOTE — TELEPHONE ENCOUNTER
Chart reviewed. MD note 9/2018 states to RTC in one year. I approved a telephone visit for this March and requested an in clinic visit in Sept 2019.

## 2019-03-06 ENCOUNTER — VIRTUAL VISIT (OUTPATIENT)
Dept: NEUROLOGY | Facility: CLINIC | Age: 19
End: 2019-03-06
Payer: COMMERCIAL

## 2019-03-06 DIAGNOSIS — G40.919 INTRACTABLE EPILEPSY WITHOUT STATUS EPILEPTICUS, UNSPECIFIED EPILEPSY TYPE (H): ICD-10-CM

## 2019-03-06 RX ORDER — LAMOTRIGINE 25 MG/1
TABLET ORAL
Qty: 180 TABLET | Refills: 3 | Status: SHIPPED | OUTPATIENT
Start: 2019-03-06 | End: 2021-07-20

## 2019-03-06 RX ORDER — LAMOTRIGINE 200 MG/1
TABLET ORAL
Qty: 180 TABLET | Refills: 3 | Status: SHIPPED | OUTPATIENT
Start: 2019-03-06 | End: 2020-03-20

## 2019-03-06 NOTE — PROGRESS NOTES
Telephone Call:   Patient has weaned off of levetiracetam.  She continues to remain seizure-free.  She is taking lamotrigine and is tolerating this well.  On this visit we talked about the importance of taking folic acid and teratogenicity associated with lamotrigine.  Patient is agreeable to continuing plan of care.  She is traveling to [place] and asked about seizure issues and health concerns in relationship to her travel.  I advised her to understand what her insurance will cover in a foreign country and appropriate health care centers in the event that she had a medical emergency.  Additionally she may always call us from [place].    Prior to Admission medications    Medication Sig Start Date End Date Taking? Authorizing Provider   lamoTRIgine (LAMICTAL) 200 MG tablet Take 200 mg AM and PM 3/6/19  Yes Adia Kirk MD   lamoTRIgine (LAMICTAL) 25 MG tablet 50 mg am (along with 200 mg) 3/6/19  Yes Adia Kirk MD   cholecalciferol (VITAMIN D3) 5000 UNITS CAPS capsule Take 5,000 Units by mouth daily    Reported, Patient   diazepam (DIAZEPAM INTENSOL) 5 MG/ML (HIGH CONC) solution Give 2.5 mg (0.5 ml) for generalized tonic-clonic convulsion greater than 3 minutes. May repeat another 2.5 mg (0.5ml) after 10 minutes if she has another seizure. 6/7/17   Adia Kirk MD   folic acid (FOLVITE) 1 MG tablet Take 2 tablets (2 mg) by mouth daily 9/6/18   Adia Kirk MD   norethindrone-ethinyl estradiol (MICROGESTIN 1.5/30) 1.5-30 MG-MCG per tablet Take 1 tablet by mouth daily    Reported, Patient   sertraline (ZOLOFT) 50 MG tablet Take 50 mg by mouth daily    Reported, Patient        Plan:   Continue lamotrigine  Lamotrigine prescription was sent  Follow-up in 1 year    I spent 15 minutes with the patient on the phone obtaining a medical history, determined medical diagnosis and treatment plan, then patient was counseled on this treatment plan and diagnosis. I answered all her questions and concerns, and arranged  follow up care during this time.    Adia Kirk MD

## 2019-07-08 ENCOUNTER — TELEPHONE (OUTPATIENT)
Dept: NEUROLOGY | Facility: CLINIC | Age: 19
End: 2019-07-08

## 2019-07-08 DIAGNOSIS — G40.919 INTRACTABLE EPILEPSY WITHOUT STATUS EPILEPTICUS (H): ICD-10-CM

## 2019-07-08 DIAGNOSIS — G40.919 INTRACTABLE EPILEPSY WITHOUT STATUS EPILEPTICUS, UNSPECIFIED EPILEPSY TYPE (H): Primary | ICD-10-CM

## 2019-07-08 NOTE — TELEPHONE ENCOUNTER
What is the concern that needs to be addressed by a nurse? Patient has questions on side effects for the lamictal 200 mg tab.Please call patient back.     May a detailed message be left on Hydroboltil? Yes     Date of last office visit:03/06/2019     Message routed to:[facility] RN POOL

## 2019-07-08 NOTE — TELEPHONE ENCOUNTER
Mi is wondering if Lamictal causes any mental haze or slow down. Mi hasn't been as fast as she used to be since starting Lamictal. Mi feels this was a slow increase, however she has been on the same dosage for quite awhile.

## 2019-07-09 NOTE — TELEPHONE ENCOUNTER
Lab order for Lamictal Level has been sent to [facility].    PLAN:  Mi will return to clinic for follow up with Dr. Kirk.

## 2019-08-31 DIAGNOSIS — G40.919 INTRACTABLE EPILEPSY WITHOUT STATUS EPILEPTICUS, UNSPECIFIED EPILEPSY TYPE (H): Primary | ICD-10-CM

## 2019-09-03 RX ORDER — LAMOTRIGINE 200 MG/1
TABLET ORAL
Qty: 180 TABLET | Refills: 0 | Status: SHIPPED | OUTPATIENT
Start: 2019-09-03 | End: 2020-02-26

## 2019-09-03 RX ORDER — FOLIC ACID 1 MG/1
TABLET ORAL
Qty: 180 TABLET | Refills: 0 | Status: SHIPPED | OUTPATIENT
Start: 2019-09-03

## 2020-02-24 DIAGNOSIS — G40.919 INTRACTABLE EPILEPSY WITHOUT STATUS EPILEPTICUS, UNSPECIFIED EPILEPSY TYPE (H): ICD-10-CM

## 2020-02-26 RX ORDER — LAMOTRIGINE 200 MG/1
TABLET ORAL
Qty: 180 TABLET | Refills: 0 | OUTPATIENT
Start: 2020-02-26

## 2020-03-10 ENCOUNTER — HEALTH MAINTENANCE LETTER (OUTPATIENT)
Age: 20
End: 2020-03-10

## 2020-03-16 DIAGNOSIS — G40.919 INTRACTABLE EPILEPSY WITHOUT STATUS EPILEPTICUS, UNSPECIFIED EPILEPSY TYPE (H): ICD-10-CM

## 2020-03-20 RX ORDER — LAMOTRIGINE 200 MG/1
200 TABLET ORAL 2 TIMES DAILY
Qty: 180 TABLET | Refills: 0 | Status: SHIPPED | OUTPATIENT
Start: 2020-03-20 | End: 2020-07-08

## 2020-03-20 NOTE — TELEPHONE ENCOUNTER
Refill request for Lamotrigine 200 mg tabs received.  Last seen in clinic by Dr. Kirk on 9/6/2018.   Virtual visit 3/6/19 with Dr. Kirk.  No follow up made.   Plan from last visit - continue Lamotrigine dose 250 mg AM and 200 mg PM.  Okay to refill 90 days per protocol.  Will message  to contact patient for follow up.

## 2020-04-22 ENCOUNTER — TELEPHONE (OUTPATIENT)
Dept: NEUROLOGY | Facility: CLINIC | Age: 20
End: 2020-04-22

## 2020-04-22 NOTE — TELEPHONE ENCOUNTER
Patient was contacted several times to schedule a one year follow up with Dr. Kirk. Patient was put on a recall list to be contacted at a later date.

## 2020-07-06 DIAGNOSIS — G40.919 INTRACTABLE EPILEPSY WITHOUT STATUS EPILEPTICUS, UNSPECIFIED EPILEPSY TYPE (H): ICD-10-CM

## 2020-07-08 RX ORDER — LAMOTRIGINE 200 MG/1
200 TABLET ORAL 2 TIMES DAILY
Qty: 60 TABLET | Refills: 11 | Status: SHIPPED | OUTPATIENT
Start: 2020-07-08 | End: 2021-12-30

## 2020-07-08 NOTE — TELEPHONE ENCOUNTER
lamoTRIgine (LAMICTAL) 200 MG tablet    Last Written Prescription Date:  3/20/2020  Last Fill Quantity: 180,   # refills: 0  Last Office Visit : 3/6/2019  Future Office visit:  None    Routing refill request to provider for review/approval because:  Over Due Office Visit.      30 day pended  Provider and  Pool notified    Alexandra Dominique RN  Central Triage Red Flags/Med Refills

## 2020-12-27 ENCOUNTER — HEALTH MAINTENANCE LETTER (OUTPATIENT)
Age: 20
End: 2020-12-27

## 2021-04-24 ENCOUNTER — HEALTH MAINTENANCE LETTER (OUTPATIENT)
Age: 21
End: 2021-04-24

## 2021-07-18 DIAGNOSIS — G40.919 INTRACTABLE EPILEPSY WITHOUT STATUS EPILEPTICUS, UNSPECIFIED EPILEPSY TYPE (H): ICD-10-CM

## 2021-07-20 RX ORDER — LAMOTRIGINE 25 MG/1
TABLET ORAL
Qty: 180 TABLET | Refills: 3 | Status: SHIPPED | OUTPATIENT
Start: 2021-07-20

## 2021-07-20 NOTE — TELEPHONE ENCOUNTER
LAMOTRIGINE 25MG TABLETS   Last Written Prescription Date:  7/8/2020  Last Fill Quantity: 60,   # refills: 11  Last Office Visit : 3/6/2019  Future Office visit:  None    Routing refill request to provider for review/approval because:  Gap in office visit    March 2019??  Refer to Provider for review       Alexandra Dominique RN  Central Triage Red Flags/Med Refills

## 2021-10-09 ENCOUNTER — HEALTH MAINTENANCE LETTER (OUTPATIENT)
Age: 21
End: 2021-10-09

## 2021-12-29 DIAGNOSIS — G40.919 INTRACTABLE EPILEPSY WITHOUT STATUS EPILEPTICUS, UNSPECIFIED EPILEPSY TYPE (H): ICD-10-CM

## 2021-12-29 NOTE — TELEPHONE ENCOUNTER
Refill requested for: Lamotrigine 200 mg    Last ordered: 7/8/20    Last Filled:     Last Clinic Visit: 6/19    Next Clinic Visit: none scheduled    Labs: Last Lamotrigine level done 3/12/18 = 12.5    From last visit note:   Plan:   Continue lamotrigine  Lamotrigine prescription was sent  Follow-up in 1 year    Action taken:   Placed call to patient and left voice mail to inquire if this request is an error due to her not having been here in almost tree years and med has not been ordered in 1-1/2 years.  Will wait to hear from patient.    Jaswinder Parrish, RN    Jaswinder Parrish, RN

## 2022-01-03 RX ORDER — LAMOTRIGINE 200 MG/1
200 TABLET ORAL 2 TIMES DAILY
Qty: 60 TABLET | Refills: 0 | Status: SHIPPED | OUTPATIENT
Start: 2022-01-03

## 2022-05-21 ENCOUNTER — HEALTH MAINTENANCE LETTER (OUTPATIENT)
Age: 22
End: 2022-05-21

## 2022-09-04 DIAGNOSIS — G40.919 INTRACTABLE EPILEPSY WITHOUT STATUS EPILEPTICUS, UNSPECIFIED EPILEPSY TYPE (H): ICD-10-CM

## 2022-09-08 RX ORDER — LAMOTRIGINE 25 MG/1
TABLET ORAL
Qty: 180 TABLET | Refills: 3 | OUTPATIENT
Start: 2022-09-08

## 2022-09-17 ENCOUNTER — HEALTH MAINTENANCE LETTER (OUTPATIENT)
Age: 22
End: 2022-09-17

## 2023-04-06 NOTE — TELEPHONE ENCOUNTER
Follow up call made to Mi.  Mi has tapered off LEV  She continues to take HANSEN 250-200.  Denies having had any seizures or side effects.  Mi would like an additional follow up call in two weeks.   Routed to scheduling to coordinate an additional phone call in two weeks.    used

## 2023-06-03 ENCOUNTER — HEALTH MAINTENANCE LETTER (OUTPATIENT)
Age: 23
End: 2023-06-03